# Patient Record
Sex: FEMALE | Race: WHITE | URBAN - METROPOLITAN AREA
[De-identification: names, ages, dates, MRNs, and addresses within clinical notes are randomized per-mention and may not be internally consistent; named-entity substitution may affect disease eponyms.]

---

## 2017-10-23 ENCOUNTER — OFFICE VISIT (OUTPATIENT)
Dept: PAIN CLINIC | Facility: HOSPITAL | Age: 52
End: 2017-10-23
Attending: NURSE PRACTITIONER
Payer: COMMERCIAL

## 2017-10-23 VITALS — HEART RATE: 80 BPM | SYSTOLIC BLOOD PRESSURE: 128 MMHG | DIASTOLIC BLOOD PRESSURE: 77 MMHG

## 2017-10-23 DIAGNOSIS — M54.2 NECK PAIN: Primary | ICD-10-CM

## 2017-10-23 PROCEDURE — 99211 OFF/OP EST MAY X REQ PHY/QHP: CPT

## 2017-10-23 RX ORDER — GABAPENTIN 300 MG/1
300 CAPSULE ORAL 3 TIMES DAILY
Qty: 180 CAPSULE | Refills: 5 | Status: SHIPPED | OUTPATIENT
Start: 2017-10-23

## 2017-10-23 NOTE — PROGRESS NOTES
Patient presents to [facility] ambulatory for med eval.  She has low back pain and neck pain that radiates to her right arm with intermittent numbness and tingling.   She does stretches and exercises for pain relief and injections which have helped in the past.  S

## 2017-10-23 NOTE — CHRONIC PAIN
Follow-up Note  HISTORY OF PRESENT ILLNESS:  Ernie Fung is a 47year old old female, originally referred to the pain clinic by Dr. Laura choi. provider found, returns to the clinic for lower back pain and neck pain.  She reports that she has been doin Negative  Integumentary :  Negative  Psychiatric:  Negative  Hematologic: No active bleeding  Lymphatic: No current lymphedema  Allergic/Immunologic:  Negative  Musculoskeletal: As above  Neurological: As above  Denies chest pain, shortness of breath.     Yudy Flores 5/5 5/5   Right Deep tendon reflexes: Symmetric   Left Deep tendon reflexes: Symmetric     Temperature:  normal to touch bilateral upper and lower extremities  Sensation (light touch/pinprick/temperature):   Right Lower Extremity:  Normal  Left Lower Extre

## 2019-03-07 RX ORDER — GABAPENTIN 300 MG/1
CAPSULE ORAL
Qty: 180 CAPSULE | Refills: 0 | OUTPATIENT
Start: 2019-03-07